# Patient Record
Sex: FEMALE | Race: BLACK OR AFRICAN AMERICAN | Employment: FULL TIME | URBAN - METROPOLITAN AREA
[De-identification: names, ages, dates, MRNs, and addresses within clinical notes are randomized per-mention and may not be internally consistent; named-entity substitution may affect disease eponyms.]

---

## 2017-03-08 ENCOUNTER — HOSPITAL ENCOUNTER (OUTPATIENT)
Dept: MAMMOGRAPHY | Age: 57
Discharge: HOME | End: 2017-03-08
Payer: COMMERCIAL

## 2017-03-08 DIAGNOSIS — Z12.31 VISIT FOR SCREENING MAMMOGRAM: ICD-10-CM

## 2017-03-08 PROCEDURE — 77063 BREAST TOMOSYNTHESIS BI: CPT

## 2019-02-27 ENCOUNTER — HOSPITAL ENCOUNTER (OUTPATIENT)
Dept: NUTRITION | Age: 59
Discharge: HOME | End: 2019-02-27
Payer: COMMERCIAL

## 2019-02-27 PROCEDURE — 97802 MEDICAL NUTRITION INDIV IN: CPT

## 2019-02-27 NOTE — PROGRESS NOTES
[address] [place] [address], [facility], [address] Phone: [phone] Fax: [phone] Nutrition Assessment  Medical Nutrition Therapy Outpatient Initial Evaluation Patient Name: Anamaria Wright : 1960 Treatment Diagnosis: Type 2 Diabetes Referral Source: Giovanni Navarrete MD Start of Care [facility]): 2019 Gender: female Age: 64 y.o. Ht: 65 in Wt:  151.2 lb  kg BMI: 25.2 BMR Male  [facility] Female 1400 Past Medical History:  HTN, T2DM, hx of Gestational DM Pertinent Medications:  
Metformin, Gliperamide, Lisinopril, Biochemical Data:  
No results found for: HBA1C, HGBE8, KKQ5TCRL, [id number] Lab Results Component Value Date/Time Sodium 138 2010 07:42 AM  
 Potassium 3.9 2010 07:42 AM  
 Chloride 99 (L) 2010 07:42 AM  
 CO2 31 2010 07:42 AM  
 Anion gap 8 2010 07:42 AM  
 Glucose 117 (H) 2010 07:42 AM  
 BUN 10 2010 07:42 AM  
 Creatinine 0.8 2010 07:42 AM  
 BUN/Creatinine ratio 13 2010 07:42 AM  
 GFR est AA >60 2010 07:42 AM  
 GFR est non-AA >60 2010 07:42 AM  
 Calcium 9.2 2010 07:42 AM  
 Bilirubin, total 0.3 2010 07:42 AM  
 AST (SGOT) 18 2010 07:42 AM  
 Alk. phosphatase 60 2010 07:42 AM  
 Protein, total 7.9 2010 07:42 AM  
 Albumin 4.0 2010 07:42 AM  
 Globulin 3.9 2010 07:42 AM  
 A-G Ratio 1.0 2010 07:42 AM  
 ALT (SGPT) 36 2010 07:42 AM  
 
Lab Results Component Value Date/Time Cholesterol, total 195 2010 07:42 AM  
 HDL Cholesterol 47 2010 07:42 AM  
 LDL, calculated 122.6 (H) 2010 07:42 AM  
 VLDL, calculated 25.4 2010 07:42 AM  
 Triglyceride 127 2010 07:42 AM  
 CHOL/HDL Ratio 4.1 2010 07:42 AM  
 
Lab Results Component Value Date/Time  ALT (SGPT) 36 2010 07:42 AM  
 AST (SGOT) 18 2010 07:42 AM  
 Alk. phosphatase 60 07/27/2010 07:42 AM  
 Bilirubin, direct 0.1 07/27/2010 07:42 AM  
 Bilirubin, total 0.3 07/27/2010 07:42 AM  
 
Lab Results Component Value Date/Time Creatinine 0.8 07/27/2010 07:42 AM  
 
Lab Results Component Value Date/Time BUN 10 07/27/2010 07:42 AM  
 
No results found for: MCACR, MCA1, MCA2, MCA3, MCAU, MCAU2, MCALPOCT Assessment: 
 Pt reports having gone to DM education 28 years ago, with a focus on wt loss and having a diet plan with pre-planned food (with reduced carbs) during gestational diabetes. T2DM dx came around 2007. Pt is a radiology tech and works shift work. Lives alone, shops/cooks for self. Pt BMI in overweight category. Pt reports her last two A1C's have been 7.1% and 7.3%. Pt not checking BG, even though doctor recommended. Monday can prepare a meal but by Wednesday the meal gets morning. Shift work gets in the way of eating the way she used to d/t a change in her hours. She is also the only tech, which causes her to be busy and have unpredictable working hours and eating schedule. Pt reports trouble areas for food are fast food. During nutrition education, pt reports having a sandwich, with fruit, with chips for lunch sometimes. Food & Nutrition: Pt usually just drinks water. Fast food 2x/wk. Not skipping meals. 24 hour recall: 
B: grits 1 cup, butter, coffee (yellow packets) L: Fried Chicken sandwich with small Jignesh Query fries (fast food), 1/2 of small can pineapple, diet soda 
1:00am: chicken and with stars soup, 1 can, diet soda 10:54am: chick-reshma-a (or cereal) *If snacking - will have SF jello or peanut butter crackers, jorge crackers, dao snaps, ETOH - wine once a month Pt reports eating bagels frequently and loves bread. Estimate Needs Calories: 1400  Protein: 105 Carbs: 140 Fat: 47 Kcal/day  g/day  g/day  g/day   
    percent: 30  40  30 Nutrition Intervention & 
 Education: Educated pt on pathophysiology of diabetes. Used food models to demonstrate carbohydrate servings. Used empty food product boxes/containers to practice reading labels (carb, protein, and serving size). Described optimal macronutrient distribution throughout the day. Provided food suggestions and how to make small changes to increase protein and decrease carbohydrate intake at meals. Recommend pairing carbs with protein. Discussed carbohydrate content of bagel. Recommend pt log food. Handouts Provided: [x]  Carbohydrates [x]  Protein 
[x]  Non-starchy Vegatbles [x]  Food Label 
[x]  Meal and Snack Ideas 
[]  Food Journals [x]  Diabetes []  Cholesterol 
[]  Sodium 
[]  Gen Nutr Guidelines 
[]  SBGM Guidelines 
[]  Others:  
Information Reviewed with: Pt Readiness to Change Stage: [x]  Pre-contemplative    []  Contemplative 
[]  Preparation               []  Action                  []  Maintenance Potential Barriers to Learning: []  Decline in memory    []  Language barrier   []  Other: 
[]  Emotional                  []  Limited mobility 
[]  Lack of motivation     [] Vision, hearing or cognitive impairment Expected Compliance: Fair due to pt never having been given this information and feeling overwhelmed. Nutritional Goal - To promote lifestyle changes to result in:   
[x]  Weight loss [x]  Improved diabetic control 
[]  Decreased cholesterol levels 
[]  Decreased blood pressure 
[]  Weight maintenance []  Preventing any interactions associated with food allergies []  Adequate weight gain toward goal weight 
[]  Other:  
  
 
Patient Goals: 1. Log carb intake @ meals/snacks 2. Start looking at nutrition labels for carbohydrate and protein content. *Pt wanting to keep goals simple to start. Dietitian Signature: Garth Barrera [place] Date: 2/27/2019 Follow-up:  Time: 10:49 AM

## 2019-03-26 ENCOUNTER — HOSPITAL ENCOUNTER (OUTPATIENT)
Dept: NUTRITION | Age: 59
Discharge: HOME | End: 2019-03-26
Payer: COMMERCIAL

## 2019-03-26 PROCEDURE — 97803 MED NUTRITION INDIV SUBSEQ: CPT

## 2019-03-26 NOTE — PROGRESS NOTES
NUTRITION  FOLLOW-UP TREATMENT NOTE Patient Name: Samina Agrawal         Date: 3/26/2019 : 1960    YES Patient  Verified Diagnosis: Type 2 Diabetes In time:   10:00             Out time:   10:30 Total Treatment Time (min):   30  
SUBJECTIVE/ASSESSMENT Current Wt: 148. 4 Previous Wt: 151.2 Wt Change: -2.8 Changes in medication or medical history? Any new allergies, surgeries or procedures? NO    If yes, update Summary List  
Pt in for 2nd visit (follow-up from initial) for management of type 2 diabetes. Pt's main goal is A1C control and health rather than weight loss. Pt does not want to lose too much weight. Pt current BMI = 24.7, taking her from the overweight range at initial visit (25.2) to the healthy weight range. Pt's adjusted body weight is 131 pounds (using 25%). Pt reports that she was occassionally checking blood glucose before, but has been doing it regularly now. She reports checking 2x/week and every time she checks, it is within the normal range. She states she is \"very invested\" in making changes to become healthy as she is learning about food, logging food, making modifications to diet, and purchased a new glucometer. Changes that patient has made: 
- switched from tuna sandwich with bread to tuna without bread 
-hasn't had potato chips (was eating frequently) 
-only had french fries 2x (and smaller portion) 
-not adding granola to yogurt 
-Has been reading nutrition labels 
-has been logging on katlyn called Carb Manager (doesn't like this katlyn) 
-wheat bread all the time 
-checking BG 2x/week 
-she read the [employer] nutrition facts for the sausage biscuit and realized how many calories it was. -She orders this lasagna meal and used to have 1/2 of it (2 meals), but now cutting it into 1/4 (4 meals). Pt reports her biggest learning experience was at her initial nutrition visit.  She states she had an \"ah-ha\" moment realizing the appropriate portion size of carbohydrates compared to what she was eating. She states she used to have sweets at the movie theater, but hasn't been now. She reports after the previews she didn't really notice not having any. Pt's next A1C check is in May. Pt reports wanting a SF apple pie slice from fast food 1x/month. Encouraged pt to look it up on myfitness pal beforehand to assess nutrients and then she can fit it into her day accordingly (likely needing to add some protein to it to even out macros) Nutrition Diagnosis Inappropriate intake of types of carbohydrate related to nutrition-related knowledge deficit as evidenced by BMI >25, Glu 117 (H) - 2010 lab, T2DM diagnosis, A1C around 7.1-7.3 per pt report. Nutrition Prescription and  Intervention Reviewed goals. Educated patient on ways to increase protein. Educated on and demonstrated my fitness pal katlyn use to log calories. Encouraged pt to alter the [employer] katlyn to our calorie and macronutrient goals rather than the katlyn goals. Showed pt how to enter foods and use barcode scanner. Discussed possible snack alternatives to movie theater. Provided suggestions on how to increase protein at meals and snacks. Calculated Adjusted body weight to answer pt question of what her goal weight might be. Discussed that at this weight, we would want to be incorporating strength training to increase or maintain LMM. Patient Education:  [x]  Review current plan with patient  
[]  Other:   
Handouts/ Information Provided: []  Carbohydrates 
[]  Protein []  Fiber 
[]  Serving Sizes []  Fluids 
[]  General guidelines []  Diabetes []  Cholesterol 
[]  Sodium []  SBGM []  Food Journals 
[x]  Others: Barriers worksheet Nutrition Monitoring and Evaluation: Pt did try to log calories but was frustrated with the katlyn she choose and didn't quite know how to use it. She will try Myfitness pal this month.  Pt met her goal of reading nutrition labels - she even looked at fast food meals that she frequently would eat. Patient Goals 1. Aim for 20-25g protein/meal 
2. Purchase a food scale to measure meat at home 3. Log food daily on MFP 4. Walk @ least 2x in between today's visit and next visit. (pt stated she wanted to start walking). PLAN [x]  Continue on current plan []  Follow-up PRN  
[]  Discharge due to :   
[]  Next appt: April 23 @ 10:00am  
 
Dietitian: Carson Fernandez PSM, RD, [facility] Date: 3/26/2019 Time: 9:57 AM

## 2019-04-23 ENCOUNTER — HOSPITAL ENCOUNTER (OUTPATIENT)
Dept: NUTRITION | Age: 59
Discharge: HOME | End: 2019-04-23
Payer: COMMERCIAL

## 2019-04-23 PROCEDURE — 97803 MED NUTRITION INDIV SUBSEQ: CPT

## 2019-04-23 NOTE — PROGRESS NOTES
NUTRITION  FOLLOW-UP TREATMENT NOTE Patient Name: Jaxon Holladn         Date: 2019 : 1960    YES/NO Patient  Verified Diagnosis:Type 2 Diabetes In time:   10:00             Out time:   10:30 Total Treatment Time (min):   30  
SUBJECTIVE/ASSESSMENT Current Wt: 149.8 Previous Wt: 148. 4 Wt Change: + 1.4 Changes in medication or medical history? Any new allergies, surgeries or procedures? NO    If yes, update Summary List  
Pt in for nutrition follow-up. Pt reports that she was still drinking soda for 2 weeks. Has been doing spring cleaning and cleaning a room/day. Pt reports she is feeling better and feels like she has made many positive changes. She reports her clothes are fitting better. She reports that she was able to fit into a size 8 much more comfortably. Pt is concerned about looking emaciated. Breakfast: 1/2 cup cereal kellog + 2% milk + 6 strawberries. Discussed a potential carb goal of 35g CHO/meal with 15g/snack (@ 2 snacks/day). 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 Pt reports that she is tired. Pt is using carb manager katlyn. Patient wants to walk. She reports that she has a lot of anxiety. She reports her anxiety comes from not wanting to be alone and have something tragic happen to her. Her mother  at age 40 and she's worried about walking alone (something could happen to her). Pt reports she has \"conquered and control over a lot\": mini-popcorn bags. Looking for new things. She hasn't been over-eating bread. She's been reading labels and comparing. Water is tasting better to her. She has less anxiety than she used to have. Living to eat vs eating to live. Has limited her TV watching. Pt has tried cooking more. Knowledge has helped her \"conquer it\". Pt has limited pizza, hot dog.   
 
One of her biggest goals/motivators is to be able to go walk at [facility] [place]. But, for now she states it might be easier for her to incorporate walking right after she gets off work. Nutrition Diagnosis Inappropriate intake of types of carbohydrate related to nutrition-related knowledge deficit as evidenced by BMI >25, Glu 117 (H) - 2010 lab, T2DM diagnosis, A1C around 7.1-7.3 per pt report. Nutrition Prescription and  Intervention Discussed ways to align her heart and mind. She reports that her heart is on the right track, but she needs to get her mind there. Discussed the importance of quality sleep for health and weight loss. Also discussed prolonging the time between her breakfast/dinner meal and going to sleep by at least 2 hours (ideally 3-4). Addressed exercise barriers and problem solved around them. Patient Education:  [x]  Review current plan with patient  
[]  Other:   
Handouts/ Information Provided: []  Carbohydrates 
[]  Protein []  Fiber 
[]  Serving Sizes []  Fluids 
[]  General guidelines []  Diabetes []  Cholesterol 
[]  Sodium []  SBGM []  Food Journals 
[]  Others:  
Nutrition Monitoring and Evaluation:  (1) aim for 20-25g of protein/meal = Pt logging carbs only (2) food scale = did not buy 
(3) log food daily = continue - not consistently (4) walk at least 2x in between now and next appointment. 50% met - walked around mall. Patient Goals 1. Longer intervals between eating dinner and sleep 2. Bring gym clothes to work (walk 2d/week) = set a reminder on phone. Planner? 3. Limit Sodas PLAN [x]  Continue on current plan []  Follow-up PRN  
[]  Discharge due to :   
[x]  Next appt: May 28th @ 10:00am  
 
Dietitian: Madelaine Scheuermann, PSM, RD, [facility] Date: 4/23/2019 Time: 9:57 AM

## 2019-05-28 ENCOUNTER — HOSPITAL ENCOUNTER (OUTPATIENT)
Dept: NUTRITION | Age: 59
Discharge: HOME | End: 2019-05-28
Payer: COMMERCIAL

## 2019-05-28 PROCEDURE — 97803 MED NUTRITION INDIV SUBSEQ: CPT

## 2019-05-28 NOTE — PROGRESS NOTES
NUTRITION  FOLLOW-UP TREATMENT NOTE  Patient Name: Joslyn Bean         Date: 2019  : 1960    YES Patient  Verified  Diagnosis: Type 2 Diabetes   In time: 10:00a         Out time:   10:30a   Total Treatment Time (min):   30     SUBJECTIVE/ASSESSMENT    Current Wt: 149.2 Previous Wt: 148. 4 Wt Change: + 0.8     Changes in medication or medical history? Any new allergies, surgeries or   procedures? NO    If yes, update Summary List   Pt in for nutrition follow-up. Visit #4. Pt had her PCP appointment on Monday with good reports on her labwork. BG was down, blood pressure, blood lipids. Pt does not have values with her, but reports that the doctor told her all her lab work was improving. Pt reports drinking more water. Carriers water bottle with her. This week was hospital week at work, pt reports she did well the first day by making healthier choices. She states that she had a piece of pizza on Saturday and felt guilty, but she had a salad with it. Pt reports she learned that it's easy to go off track. Pt states that she has made other changes like bringing only water to the movie theater, and is not eating junk food at the movie theater. She has reduced the amount of times that she goes out to eat. When she does go out to eat, she is making better choices. Pt has also changed the fact that she is more active at night after dinner, and she used to eat, then go to sleep 30 minutes later, now waiting 2 hours and doing activity during that time (washing sheet, making bed, laundry, organizing closet, etc.). Biggest challenge is on weekends when she works. 8 hours on weekends 10-6, 7:30-4pm. Day shifts. Nutrition Diagnosis CONTINUED:    Inappropriate intake of types of carbohydrate related to nutrition-related knowledge deficit as evidenced by BMI >25, Glu 117 (H) -  lab, T2DM diagnosis, A1C around 7.1-7.3 per pt report.            Nutrition Prescription and  Intervention Discussed mindfulness. Pt has been making improvements. She still goes to [employer] for breakfast at times (chicken sandwich with fries), we discussed options to make this a healthier choice (taking off one bun slice, substituting apple slices for french fries, etc.). Provided recipes for healthy meals and for meal prep. Also, provided e-mail with link to multiple recipe and meal planning websites. Pt does not like logging on cellphone d/t confusion. Suggested logging on paper, but pt did not mention during goal-setting. Patient Education:  [x]  Review current plan with patient   []  Other:    Handouts/  Information Provided: []  Carbohydrates  []  Protein  []  Fiber  []  Serving Sizes  []  Fluids  []  General guidelines -  Diabetes  -  Cholesterol  -  Sodium  -  SBGM  -  Food Journals  -  Others: recipes     Nutrition Monitoring and Evaluation:   GOAL REVIEW:  1. Gives self \"B\" on mindfulness: Pt is not where she wants to be, but she has started making changes. 2. Bring gym clothes to work, exercise 2x/week: Not met - but has started alternative - walking at work at night and taking stairs. 3. \"A\" on limited sodas. Patient Goals 1. Pay attention to purchases - look at calories. 2. Meal prep   3. Get more physical - try a yoga class this month.       PLAN    [x]  Continue on current plan []  Follow-up PRN   []  Discharge due to :    [x]  Next appt: June 18th @ 10:00am     Dietitian: Bria Arzola PSM, RD, [facility]    Date: 5/28/2019 Time: 10:00 AM

## 2019-06-18 ENCOUNTER — HOSPITAL ENCOUNTER (OUTPATIENT)
Dept: NUTRITION | Age: 59
Discharge: HOME | End: 2019-06-18
Payer: COMMERCIAL

## 2019-06-18 PROCEDURE — 97803 MED NUTRITION INDIV SUBSEQ: CPT

## 2019-06-18 NOTE — PROGRESS NOTES
NUTRITION  FOLLOW-UP TREATMENT NOTE  Patient Name: Manju Alvarez         Date: 2019  : 1960    YES/NO Patient  Verified  Diagnosis: Type 2 Diabetes   In time:  10:00         Out time:   10:30   Total Treatment Time (min):   30     SUBJECTIVE/ASSESSMENT    Current Wt: 149.0 Previous Wt: 149. 2 Wt Change: -0.2     Changes in medication or medical history? Any new allergies, surgeries or procedures? YES/NO    If yes, update Summary List   Pt in for follow-up nutrition visits, visit # 5. Pt states she feels good, and she continues to make changes. Was a size 10 clothes when she first started seeing nutrition in 2019. She dropped to size 8 a month ago and is now a size 6. She states many people are commenting that she is appearing smaller, but her weight remains the same, possibly due to diabetes. Pt is not consistently logging BG levels. Would be helpful to monitor BG levels for improvement, even if wt is maintaining the same. Pt is logging her food intake on paper but not calories, carbohydrates, or protein. She is not consistently checking her blood glucose. Pt states she is making multiple changes, but when we reviewed her food log, she is still not paying attention to the amount of carbohydrates in foods, at breakfast she is eating about 52g carb and 5g protein. Nutrition Diagnosis Inappropriate intake of types of carbohydrate related to nutrition-related knowledge deficit as evidenced by BMI >25, Glu 117 (H) - 2010 lab, T2DM diagnosis, A1C around 7.1-7.3 per pt report. Nutrition Prescription and  Intervention Educated pt on the importance of eating adequate protein for metabolism and muscle maintenance and maintaining satiety and nourishment. Discussed and provided alternative breakfast options to increase protein and eat about 30-40g CHO/meal. Provided her a long list of food product suggestions for ways to alter her meals that meet her nutrient needs.  Encouraged her to check and log her BG levels in order to monitor for improvements, even if her scale-weight isn't decreasing. Patient Education:  [x]  Review current plan with patient   []  Other:    Handouts/  Information Provided: []  Carbohydrates  []  Protein  []  Fiber  []  Serving Sizes  []  Fluids  []  General guidelines []  Diabetes  []  Cholesterol  []  Sodium  []  SBGM  []  Food Journals  []  Others:      Nutrition Monitoring and Evaluation: Pt states she is making multiple changes to her diet, she has reported decreasing fast food intake, and she has stopped eating junk food at the movie theater, and she has decreased the portion of her meal before  Bed (works shift-work so used to eat heavy meal 10-20 minutes before going to sleep). Areas of improvement: Pt has not counted carbohydrates and demonstrates slight lack of carry-over of understanding carbohydrate counting and the carbohydrate budget that was reviewed at least 2 times with her. However, she has focused on decreasing portion sizes, but has subsequently also decreased protein intake even though eating adequate lean protein has been emphasized. Will benefit from continued sessions and counting carbohydrates in her food log together in sessions. Patient Goals 1. Check BG regularly - 3x/week  2. Increase protein at all meals, molly.  Breakfast (start with 15-20g/meal and end goal ~25-30g/meal       PLAN    [x]  Continue on current plan []  Follow-up PRN   []  Discharge due to :    [x]  Next appt: July 23 @ 10:00am     Dietitian: Darinel Floyd PSM, RD, [facility]    Date: 6/18/2019 Time: 10:01 AM

## 2019-07-23 ENCOUNTER — HOSPITAL ENCOUNTER (OUTPATIENT)
Dept: NUTRITION | Age: 59
Discharge: HOME | End: 2019-07-23
Payer: COMMERCIAL

## 2019-07-23 PROCEDURE — 97803 MED NUTRITION INDIV SUBSEQ: CPT

## 2019-07-23 NOTE — PROGRESS NOTES
NUTRITION  FOLLOW-UP TREATMENT NOTE  Patient Name: Jefry Linares         Date: 2019  : 1960    YES/NO Patient  Verified  Diagnosis: type II Diabetes   In time:  10:00       Out time:  10:45   Total Treatment Time (min):  45     SUBJECTIVE/ASSESSMENT    Current Wt: 147.8 Previous Wt: 149.0 Wt Change: -1.2     Changes in medication or medical history? Any new allergies, surgeries or procedures? YES/NO    If yes, update Summary List     Pt has experienced a lot of work stress this month. Pt has this week off for vacation and is going to a [employer] at [employer]. Pt has been 155 as her usual body weight. Pt's personal goal is to be at 145 pounds. Pt is down 3.4 pounds total and her A1c is gradually decreasing (based on pt's report). Initial weight at first visit = 151.2 pounds. Pt's BMI is 24.6 (normal weight category, range: 18.5-24.9), Normal BMI weight range for her height = 111 -150 pounds. Pt states she sometimes notices herself emotional eating, but she eats the strawberries. Pt states she needs to figure out a fitness plan. Pt states she would like to tone her body and gain strength. Taking control over her eating has helped her take control over herself. This month's change: instead of a meal at night at work, she'll have a snack like a salad w/ cheese, salad dressing (olive garden ,portion controlled), croutons, tomatoes, onions, 2 breadsticks. Was doing lasagna before. Switched to the sugar free syrup on waffles and incorporated the protein/whole grain waffles. Is purchasing pieces of watermelon rather than the whole watermelon to help her control her portions. States she still doesn't have the \"cut off\" signals when eating certain foods. Pt wanted some [employer] - usually puts meat/fat in it but tried a new recipes using chicken broth. Last A1c was 7% - going down.          Nutrition Diagnosis CONTINUED:     Inappropriate intake of types of carbohydrate related to nutrition-related knowledge deficit as evidenced by BMI >25, Glu 117 (H) - 2010 lab, T2DM diagnosis, A1C around 7.1-7.3 per pt report.        Nutrition Prescription and  Intervention Educated on the importance of lean protein sources for satiation and maintaining metabolism. We discussed the small distinction between weight loss and weight-loss maintenance. Performed goal review and used motivational interviewing to have pt engage in change talk and discuss the benefits of the behaviors she has and is working on changing. Pt is engaging in some change talk around physical activity, but ambivalence is present. Provided prices for fitness classes. Provided a few example of nutrition/protein bars. Discussed physical activity barriers and benefits. Explained calendar goal sheet and the concept of \"trying\" to meet goals vs. Consciously making the decision to schedule goals. Patient Education:  [x]  Review current plan with patient   []  Other:    Handouts/  Information Provided: []  Carbohydrates  []  Protein  []  Fiber  []  Serving Sizes  []  Fluids  []  General guidelines []  Diabetes  []  Cholesterol  []  Sodium  []  SBGM  []  Food Journals  [x]  Others: 100 reasons to exercise now, mindful eating, snack tips, core values sheet     Nutrition Monitoring and Evaluation: Goal Review:  1. Pay attention to purchases - look at calories. : Gives self a B+. Pt states she had \"that emotional time\" which caused her to do some emotional eating. 2. Meal prep : Gives self B. Trying new recipes. Changing choices at work when ordering out. 3. Get more physical - try a yoga class this month.: Not met - did not try this month. Pt has a fear of passing out or having a heart condition when she's alone at home doing a yoga DVD. Patient Goals   1. Stay on plan and continue practicing mindful eating, and portion control. 2. Physical Activity - tomorrow, walk at [facility].  E-mail Tutu Fragoso update on this goal achievement. 3. Bring BG log and calendar goal sheet to next visit.           PLAN    [x]  Continue on current plan []  Follow-up PRN   []  Discharge due to :    [x]  Next appt: August 20th @ 9:30am      Dietitian: Siva Woodard PSM, RD, [facility]    Date: 7/23/2019 Time: 9:59 AM

## 2019-08-20 ENCOUNTER — HOSPITAL ENCOUNTER (OUTPATIENT)
Dept: NUTRITION | Age: 59
Discharge: HOME | End: 2019-08-20
Payer: COMMERCIAL

## 2019-08-20 PROCEDURE — 97803 MED NUTRITION INDIV SUBSEQ: CPT

## 2019-08-20 NOTE — PROGRESS NOTES
NUTRITION  FOLLOW-UP TREATMENT NOTE  Patient Name: Alisha Ridley         Date: 2019  : 1960    YES/NO Patient  Verified  Diagnosis: T2DM   In time:  9:30             Out time:  10:15   Total Treatment Time (min):  45     SUBJECTIVE/ASSESSMENT    Current Wt: 151.4 Previous Wt: 147. 8 Wt Change: +3.6     Initial Wt: 151.2 Total Wt change: +0.2 Height: 65\"     Changes in medication or medical history? Any new allergies, surgeries or procedures? YES/NO    If yes, update Summary List     Pt went to [place] recently, states it was difficult to find healthy foods. She declined dessert multiple times but did give in to other temptation foods. Pt resisted a few temptations but also experienced some peer pressure to eat sub-optimal choices (Bagel, Pizza, hashbrown, 1 pastry, potato chips, etc.). States it's like an \"addiction and once you go back, it's hard to get back on track. \"     Potato chips have been a struggle for her since she has returned home. Pt reports it's \"easy to get off track. \"     Increased her steps during [place] trip: Pt has been tracking her steps 1.7 miles (4,500 steps) on . On  3.7 miles (9,663 steps). Saturday 1.6 miles (4,351 steps). Pt still fears dying during any physical exertion. Pt considering signing up for fitness classes. Barrier: social Council and family, peer pressure to eat unhealthy foods, and they tend to shame her or make her feel bad for being \"small. \" Pt states they make her feel like something is wrong with her because she has some healthier snacks in her purse for long days in [place]. Upcoming barrier: trip back home where aunt prepares a lot of unhealthy food for her. Nutrition Diagnosis        Diagnosis Status: CONTINUED:     Inappropriate intake of types of carbohydrate related to nutrition-related knowledge deficit as evidenced by BMI >25, Glu 117 (H) -  lab, T2DM diagnosis, A1C around 7.1-7.3 per pt report.     [x] Improved []  No Change    []  Declined        Nutrition Monitoring and Evaluation:  Pt didn't check BG during [place] trip, but she states her BG's have been improving over time. She has done a great job reducing bread intake. Used to eat bread with every meal/snack, but has practiced portion control and a loaf of bread will last her 1-2 weeks. Pt didn't think she's be able to take control over her bread intake. Pt Goal Review:  Pt achieved goals up until her [place] trip, and did not achieve her goals during her trip. She had some successes on the trip, but also ate some high calorie and high fat foods. Nutrition Prescription and  Intervention Reviewed plan. Reflected on what she might do differently next time on a trip. Brainstormed some ideas for how to deal with her upcoming homecoming reunion. Discussed healthy boundaries, self-care, and how to appropriately ask for support with her eating habits to her friends and family as they tend to try to sabotage her or try to make her feel bad for making healthier choices. Reviewed her goals of lowered blood glucose and focused on decreasing weight stigma and focused on her health and wellbeing (how she feels and her quality of life with new lifestyle behaviors). Patient Education:  [x]  Review current plan with patient   []  Other:    Handouts/  Information Provided: []  Carbohydrates  []  Protein  []  Fiber  []  Serving Sizes  []  Fluids  []  General guidelines []  Diabetes  []  Cholesterol  []  Sodium  []  SBGM  []  Food Journals  [x]  Others: Emotional eating article, self-care sheet       Patient Goals 1. Reduce potato chips  2. Limit soda   3. Reflect on emotional eating and experiment with new/different coping mechanisms for stress/anxiety     PLAN    [x]  Continue on current plan []  Follow-up PRN   []  Discharge due to :    [x]  Next appt: Sept. 17th @ 060gk     Dietitian: Aurelia Lenz PSM, MAIDA, [facility]    Date: 8/20/2019 Time: 9:33 AM

## 2019-09-17 ENCOUNTER — HOSPITAL ENCOUNTER (OUTPATIENT)
Dept: NUTRITION | Age: 59
Discharge: HOME | End: 2019-09-17
Payer: COMMERCIAL

## 2019-09-17 PROCEDURE — 97803 MED NUTRITION INDIV SUBSEQ: CPT

## 2019-09-17 NOTE — PROGRESS NOTES
NUTRITION  FOLLOW-UP TREATMENT NOTE  Patient Name: Gen Monge         Date: 2019  : 1960    YES/NO Patient  Verified  Diagnosis: Type 2 Diabetes (E 11.9)   In time: 9:30am             Out time:  10:15am   Total Treatment Time (min):  45 min     SUBJECTIVE/ASSESSMENT    Current Wt: 151.4 Previous Wt: 151.4 Wt Change: Maintained     Initial Wt: 151.2 (19) Total Wt change: + 0.2 Height: 65\"  (pt report)     Changes in medication or medical history? Any new allergies, surgeries or procedures? NO    If yes, update Summary List   Pt is checking BGs and her BGs are improving and her blood pressure is improving 120/70. The other morning her BG was 70. Nutrition Diagnosis        Diagnosis Status: CONTINUED:     Inappropriate intake of types of carbohydrate related to nutrition-related knowledge deficit as evidenced by BMI >25, Glu 117 (H) -  lab, T2DM diagnosis, A1C around 7.1-7.3 per pt report. [x]  Improved []  No Change    []  Declined        Nutrition Monitoring and Evaluation: Pt's initial nutrition weight was 151.2 pounds on 19. Her initial weight from MD office was 153# on 2/15/19. On 2019, pt's weight dropped to 147.8 pounds, but after her vacation to [place], she went back up to her original weight. However, she states she is checkig her BG more frequently and her BGs are improved. She will schedule her next A1c appointment today and will e-mail me her next appointment and bring results in to next appointment. A1c on 2018 was 7.1%. She gave up potato chips. Has increased water and tea intake. She has started drinking diet sodas again. Pt used to go through a load of bread every week, currently she has had a loaf of bread in her house for 1 week and 4 days without opening it. Saturday she had chicken caesar salad with small watermelon with unsweetened tea.  Pt has decreased her fast food intake - used to go to fast food 3 or more times/week, now she doesn't go to fast food \"at all. \" Has cut back her portion at dinner, in addition to making healthier choices. Pt's clothing sizes are decreasing (was size 10 dress, now size 8). If she goes to [employer] now just getting the salad. lasagna and salad, was getting lasagna and salad before. Pt reports she is using less anxiety medicine. Pt Goal Review:  1. Reduce potato chips = met, but pt eating veggie chips (portion controlled, 6-13 chips/serving)  2. Limit soda = in progress, this month wants to eliminate  3. Reflect on emotional eating and experiment with new/different coping mechanisms for stress/anxiety, = Met, pt is practicing mindfulness and making choices that serve her overall, long-term wellbeing and goals. Nutrition Prescription and  Intervention Educated pt on managing low blood glucose and ways to prevent low blood sugar over the night. Discussed importance of meal structure and timing. Reviewed/practiced identifying carbohydrate sources in her most recent meal recall (last 2-3 meals). Asked pt to explain the benefits of her changed behviors. Patient Education:  [x]  Review current plan with patient   []  Other:    Handouts/  Information Provided: []  Carbohydrates  []  Protein  []  Fiber  []  Serving Sizes  []  Fluids  []  General guidelines []  Diabetes  []  Cholesterol  []  Sodium  []  SBGM  []  Food Journals  [x]  Others: tips/tricks, grocery list       Patient Goals 1. Start brainstorming how you'll handle some of the barriers on your homecoming trip (Nov 7th)  2. No sodas, replace with water  3. Walk 2 miles on sat  4. Sign up for [employer] training at [facility]    [x]  Continue on current plan []  Follow-up PRN   []  Discharge due to :    [x]  Next appt:  Oct 15th @ 9:30am      Dietitian: Katherine Raza PSM, RD, [facility]    Date: 9/17/2019 Time: 9:03 AM

## 2019-10-15 ENCOUNTER — HOSPITAL ENCOUNTER (OUTPATIENT)
Dept: NUTRITION | Age: 59
Discharge: HOME | End: 2019-10-15
Payer: COMMERCIAL

## 2019-10-15 PROCEDURE — 97803 MED NUTRITION INDIV SUBSEQ: CPT

## 2019-10-15 NOTE — PROGRESS NOTES
NUTRITION  FOLLOW-UP TREATMENT NOTE  Patient Name: Karon Bailey         Date: 10/15/2019  : 1960    YES/NO Patient  Verified  Diagnosis: type 2 diabetes    In time:  9:30 am             Out time:  10:15   Total Treatment Time (min):  45     SUBJECTIVE/ASSESSMENT    Current Wt: 152.2 Previous Wt: 151.4 Wt Change: +.8     Initial Wt: 151.2  19 Total Wt change: +1 Height: 65\"     Changes in medication or medical history? Any new allergies, surgeries or procedures? YES    If yes, update Summary List   Pt in for nutrition visit #9. Pt had recent A1c checked, maintained the same. Pt going on a trip at beginning of November. Nutrition Diagnosis        Diagnosis Status: CONTINUED:     Inappropriate intake of types of carbohydrate related to nutrition-related knowledge deficit as evidenced by BMI >25, Glu 117 (H) -  lab, T2DM diagnosis, A1C around 7.1-7.3 per pt report.     [x]  Improved []  No Change    []  Declined     NEW:   Self-monitoring deficit related to unwillingness to track progress and lack of attention to detail of food consumption as evidenced by pt still not logging calories or carbohydrates, pt not looking at carbohydrates per food/meals, pt surprised when we reviewed carbohydrates in her cereal and restaurant choices, pt not sure what changes to make (has been incorporating cereal to breakfast without added protein despite previous education around carbohydrate sources, provided carbohydrate limits per meal, and recommendations of adding adequate protein at all meals with carbohydrates). []  Improved []  No Change    []  Declined   [x]  NEW        Nutrition Monitoring and Evaluation: Pt has made some significant dietary changes and has incorporated multiple healthier choices (such as having a salad at [employer] instead of the [employer]. Pt has also cut back on her fast food intake, chip intake, and bread intake.  Pt's biggest struggle area at this point is that she seems to demonstrate a food and nutrition-knowledge deficit regarding what her carbohydrate goals are, how many carbohydrates are in her foods/meals, and what are sources of carbohydrates. Pt stated that she has been eating cereal for breakfast.     Pt did not sign up for [employer] fitness program (her idea/goal last month), but she did say today that she has enlisted an accountability partner to walk 2 days/week. Nutrition Prescription and  Intervention Reviewed nutrition facts of restaurants she frequently goes to and the meals she often eats. Reviewed her meal and snack carbohydrate goals. Reiterated the importance of logging all her food and beverage intake (calories, carbs, proteins, fats). Provided extensive education regarding carbohydrate content in different foods and meals and used nutrition facts label and online nutrition menu to look up these amounts together. Patient Education:  [x]  Review current plan with patient   []  Other:    Handouts/  Information Provided: []  Carbohydrates  []  Protein  []  Fiber  []  Serving Sizes  []  Fluids  []  General guidelines []  Diabetes  []  Cholesterol  []  Sodium  []  SBGM  []  Food Journals  [x]  Others: Sample 3 days meal plan       Patient Goals 1. Log food and beverages  2. Become more aware of carbs in the foods you're eating  3. Use measuring cups to measure food  4.  Read all labels and look up restaurant menu nutrition       PLAN    [x]  Continue on current plan []  Follow-up PRN   []  Discharge due to :    [x]  Next appt: Nov 19th @ 9:30am     Dietitian: Carlos Rivera PSM, RD, [facility]    Date: 10/15/2019 Time: 9:14 AM

## 2019-11-19 ENCOUNTER — APPOINTMENT (OUTPATIENT)
Dept: NUTRITION | Age: 59
End: 2019-11-19

## 2019-12-31 ENCOUNTER — HOSPITAL ENCOUNTER (OUTPATIENT)
Dept: GENERAL RADIOLOGY | Age: 59
Discharge: HOME | End: 2019-12-31
Attending: INTERNAL MEDICINE
Payer: COMMERCIAL

## 2019-12-31 DIAGNOSIS — R05.9 COUGH: ICD-10-CM

## 2019-12-31 PROCEDURE — 71046 X-RAY EXAM CHEST 2 VIEWS: CPT

## 2020-02-12 ENCOUNTER — HOSPITAL ENCOUNTER (OUTPATIENT)
Dept: MAMMOGRAPHY | Age: 60
Discharge: HOME | End: 2020-02-12
Payer: COMMERCIAL

## 2020-02-12 DIAGNOSIS — Z12.31 ENCOUNTER FOR SCREENING MAMMOGRAM FOR BREAST CANCER: ICD-10-CM

## 2020-02-12 PROCEDURE — 77063 BREAST TOMOSYNTHESIS BI: CPT

## 2020-06-17 ENCOUNTER — HOSPITAL ENCOUNTER (OUTPATIENT)
Dept: LAB | Age: 60
Discharge: HOME | End: 2020-06-17
Payer: COMMERCIAL

## 2020-06-17 PROCEDURE — 87624 HPV HI-RISK TYP POOLED RSLT: CPT

## 2020-06-17 PROCEDURE — 88175 CYTOPATH C/V AUTO FLUID REDO: CPT

## 2021-02-17 ENCOUNTER — HOSPITAL ENCOUNTER (OUTPATIENT)
Dept: MAMMOGRAPHY | Age: 61
Discharge: HOME | End: 2021-02-17
Payer: COMMERCIAL

## 2021-02-17 DIAGNOSIS — Z12.31 VISIT FOR SCREENING MAMMOGRAM: ICD-10-CM

## 2021-02-17 PROCEDURE — 77063 BREAST TOMOSYNTHESIS BI: CPT
